# Patient Record
Sex: FEMALE | Race: WHITE | NOT HISPANIC OR LATINO | Employment: FULL TIME | URBAN - NONMETROPOLITAN AREA
[De-identification: names, ages, dates, MRNs, and addresses within clinical notes are randomized per-mention and may not be internally consistent; named-entity substitution may affect disease eponyms.]

---

## 2023-01-26 PROBLEM — L40.9 PSORIASIS: Status: ACTIVE | Noted: 2023-01-26

## 2023-01-26 PROBLEM — H05.401: Status: ACTIVE | Noted: 2023-01-26

## 2023-01-26 PROBLEM — J30.2 SEASONAL ALLERGIES: Status: ACTIVE | Noted: 2023-01-26

## 2023-01-26 PROBLEM — F32.A ANXIETY AND DEPRESSION: Status: ACTIVE | Noted: 2023-01-26

## 2023-01-26 PROBLEM — L84 CALLUS OF FOOT: Status: ACTIVE | Noted: 2023-01-26

## 2023-01-26 PROBLEM — M79.604 BILATERAL LEG AND FOOT PAIN: Status: ACTIVE | Noted: 2023-01-26

## 2023-01-26 PROBLEM — R23.2 HOT FLASHES: Status: ACTIVE | Noted: 2023-01-26

## 2023-01-26 PROBLEM — E78.5 DYSLIPIDEMIA: Status: ACTIVE | Noted: 2023-01-26

## 2023-01-26 PROBLEM — J45.909 ASTHMA (HHS-HCC): Status: ACTIVE | Noted: 2023-01-26

## 2023-01-26 PROBLEM — M79.605 BILATERAL LEG AND FOOT PAIN: Status: ACTIVE | Noted: 2023-01-26

## 2023-01-26 PROBLEM — M77.8 ELBOW TENDINITIS: Status: ACTIVE | Noted: 2023-01-26

## 2023-01-26 PROBLEM — M79.672 BILATERAL LEG AND FOOT PAIN: Status: ACTIVE | Noted: 2023-01-26

## 2023-01-26 PROBLEM — K21.9 ACID REFLUX: Status: ACTIVE | Noted: 2023-01-26

## 2023-01-26 PROBLEM — R73.9 HYPERGLYCEMIA: Status: ACTIVE | Noted: 2023-01-26

## 2023-01-26 PROBLEM — L90.0 LICHEN SCLEROSUS: Status: ACTIVE | Noted: 2023-01-26

## 2023-01-26 PROBLEM — D22.9 SKIN MOLE: Status: ACTIVE | Noted: 2023-01-26

## 2023-01-26 PROBLEM — F41.9 ANXIETY AND DEPRESSION: Status: ACTIVE | Noted: 2023-01-26

## 2023-01-26 PROBLEM — B07.0 PLANTAR WART, LEFT FOOT: Status: ACTIVE | Noted: 2023-01-26

## 2023-01-26 PROBLEM — M79.671 BILATERAL LEG AND FOOT PAIN: Status: ACTIVE | Noted: 2023-01-26

## 2023-01-26 RX ORDER — PAROXETINE 10 MG/1
1 TABLET, FILM COATED ORAL DAILY
COMMUNITY
Start: 2022-09-08 | End: 2023-03-09 | Stop reason: ALTCHOICE

## 2023-01-26 RX ORDER — PRAVASTATIN SODIUM 10 MG/1
1 TABLET ORAL NIGHTLY
COMMUNITY
Start: 2020-09-15 | End: 2023-12-04

## 2023-01-26 RX ORDER — OMEPRAZOLE 20 MG/1
1 CAPSULE, DELAYED RELEASE ORAL DAILY
COMMUNITY
Start: 2014-08-26 | End: 2023-06-29

## 2023-01-26 RX ORDER — ALBUTEROL SULFATE 90 UG/1
1-2 AEROSOL, METERED RESPIRATORY (INHALATION) AS NEEDED
COMMUNITY
End: 2023-03-09 | Stop reason: SDUPTHER

## 2023-01-26 RX ORDER — CLOTRIMAZOLE AND BETAMETHASONE DIPROPIONATE 10; .64 MG/G; MG/G
1 CREAM TOPICAL DAILY PRN
COMMUNITY
Start: 2019-10-17

## 2023-01-26 RX ORDER — LORATADINE 10 MG/1
1 TABLET ORAL DAILY PRN
COMMUNITY
Start: 2021-09-09

## 2023-03-09 ENCOUNTER — OFFICE VISIT (OUTPATIENT)
Dept: PRIMARY CARE | Facility: CLINIC | Age: 53
End: 2023-03-09
Payer: COMMERCIAL

## 2023-03-09 VITALS
HEART RATE: 71 BPM | DIASTOLIC BLOOD PRESSURE: 74 MMHG | BODY MASS INDEX: 27.79 KG/M2 | TEMPERATURE: 98.2 F | HEIGHT: 65 IN | SYSTOLIC BLOOD PRESSURE: 128 MMHG | OXYGEN SATURATION: 97 % | WEIGHT: 166.8 LBS

## 2023-03-09 DIAGNOSIS — J45.909 ASTHMA, UNSPECIFIED ASTHMA SEVERITY, UNSPECIFIED WHETHER COMPLICATED, UNSPECIFIED WHETHER PERSISTENT (HHS-HCC): ICD-10-CM

## 2023-03-09 DIAGNOSIS — H93.8X1 EAR CONGESTION, RIGHT: Primary | ICD-10-CM

## 2023-03-09 DIAGNOSIS — Z12.31 ENCOUNTER FOR SCREENING MAMMOGRAM FOR BREAST CANCER: ICD-10-CM

## 2023-03-09 PROBLEM — M77.8 ELBOW TENDINITIS: Status: RESOLVED | Noted: 2023-01-26 | Resolved: 2023-03-09

## 2023-03-09 PROBLEM — M79.672 BILATERAL LEG AND FOOT PAIN: Status: RESOLVED | Noted: 2023-01-26 | Resolved: 2023-03-09

## 2023-03-09 PROBLEM — M79.604 BILATERAL LEG AND FOOT PAIN: Status: RESOLVED | Noted: 2023-01-26 | Resolved: 2023-03-09

## 2023-03-09 PROBLEM — M79.671 BILATERAL LEG AND FOOT PAIN: Status: RESOLVED | Noted: 2023-01-26 | Resolved: 2023-03-09

## 2023-03-09 PROBLEM — D22.9 SKIN MOLE: Status: RESOLVED | Noted: 2023-01-26 | Resolved: 2023-03-09

## 2023-03-09 PROBLEM — B07.0 PLANTAR WART, LEFT FOOT: Status: RESOLVED | Noted: 2023-01-26 | Resolved: 2023-03-09

## 2023-03-09 PROBLEM — L84 CALLUS OF FOOT: Status: RESOLVED | Noted: 2023-01-26 | Resolved: 2023-03-09

## 2023-03-09 PROBLEM — R73.9 HYPERGLYCEMIA: Status: RESOLVED | Noted: 2023-01-26 | Resolved: 2023-03-09

## 2023-03-09 PROBLEM — M79.605 BILATERAL LEG AND FOOT PAIN: Status: RESOLVED | Noted: 2023-01-26 | Resolved: 2023-03-09

## 2023-03-09 PROCEDURE — 1036F TOBACCO NON-USER: CPT | Performed by: FAMILY MEDICINE

## 2023-03-09 PROCEDURE — 3008F BODY MASS INDEX DOCD: CPT | Performed by: FAMILY MEDICINE

## 2023-03-09 PROCEDURE — 99213 OFFICE O/P EST LOW 20 MIN: CPT | Performed by: FAMILY MEDICINE

## 2023-03-09 RX ORDER — ALBUTEROL SULFATE 90 UG/1
1-2 AEROSOL, METERED RESPIRATORY (INHALATION) AS NEEDED
Qty: 18 G | Refills: 3 | Status: SHIPPED | OUTPATIENT
Start: 2023-03-09

## 2023-03-09 ASSESSMENT — PATIENT HEALTH QUESTIONNAIRE - PHQ9
1. LITTLE INTEREST OR PLEASURE IN DOING THINGS: NOT AT ALL
2. FEELING DOWN, DEPRESSED OR HOPELESS: NOT AT ALL
SUM OF ALL RESPONSES TO PHQ9 QUESTIONS 1 AND 2: 0

## 2023-03-09 NOTE — PROGRESS NOTES
"Subjective   Patient ID: Vonda Callahan is a 52 y.o. female who presents for Ear (Feels like there is water in right ear and can't get it out-no pain).    HPI  Here for follow up, has been having ear congestion x few weeks periodically, does have nasal congestion, using loratadine. No fever, no cough.   Asthma under control, needing refill of her albuterol.   Has gained weight, restarted her exercise regimen and watching her diet    Never started the paxil , read on the side effects and didn't start.   Anxiety is manageable    Review of Systems    General: no fever  Eyes: no blurry vision  ENT: see HPI  Resp: no cough, sob or wheezing  Cardio: no chest pain, no palpitations  Abd: no nausea/vomiting  : no dysuria, no increased urinary frequency      /74   Pulse 71   Temp 36.8 °C (98.2 °F)   Ht 1.651 m (5' 5\")   Wt 75.7 kg (166 lb 12.8 oz)   SpO2 97%   BMI 27.76 kg/m²     Objective   Physical Exam  Gen: NAD, alert  Head: normocephalic/atraumatic  Eyes: conjunctivae normal  Ears: canals clear bilaterally, mild fluid behind ear  Nose: Deferred due to covid precautions, wearing mask  Oropharynx: Deferred due to covid precautions, wearing mask  Resp: Clear to auscultation  CVS: Regular rate and rhythm  Abdomen: soft, NT, ND  Ext: no edema, NT of lower extremities  Neuro: gait normal     Assessment/Plan   Problem List Items Addressed This Visit       Asthma    Relevant Medications    albuterol 90 mcg/actuation inhaler     Other Visit Diagnoses       Ear congestion, right    -  Primary  Continue loratadine    Encounter for screening mammogram for breast cancer        Relevant Orders    BI mammo bilateral screening tomosynthesis    BMI 27.0-27.9,adult                   "

## 2023-05-31 ENCOUNTER — OFFICE VISIT (OUTPATIENT)
Dept: PRIMARY CARE | Facility: CLINIC | Age: 53
End: 2023-05-31
Payer: COMMERCIAL

## 2023-05-31 VITALS
OXYGEN SATURATION: 98 % | WEIGHT: 158.6 LBS | BODY MASS INDEX: 26.42 KG/M2 | TEMPERATURE: 98.6 F | HEART RATE: 100 BPM | HEIGHT: 65 IN | DIASTOLIC BLOOD PRESSURE: 70 MMHG | SYSTOLIC BLOOD PRESSURE: 110 MMHG

## 2023-05-31 DIAGNOSIS — N39.0 URINARY TRACT INFECTION WITHOUT HEMATURIA, SITE UNSPECIFIED: ICD-10-CM

## 2023-05-31 DIAGNOSIS — N30.01 ACUTE CYSTITIS WITH HEMATURIA: Primary | ICD-10-CM

## 2023-05-31 DIAGNOSIS — R31.9 HEMATURIA, UNSPECIFIED TYPE: ICD-10-CM

## 2023-05-31 LAB
POC APPEARANCE, URINE: CLEAR
POC BILIRUBIN, URINE: NEGATIVE
POC BLOOD, URINE: ABNORMAL
POC COLOR, URINE: ABNORMAL
POC GLUCOSE, URINE: NEGATIVE MG/DL
POC KETONES, URINE: ABNORMAL MG/DL
POC LEUKOCYTES, URINE: ABNORMAL
POC NITRITE,URINE: NEGATIVE
POC PH, URINE: 6 PH
POC PROTEIN, URINE: ABNORMAL MG/DL
POC SPECIFIC GRAVITY, URINE: 1.02
POC UROBILINOGEN, URINE: 0.2 EU/DL

## 2023-05-31 PROCEDURE — 99213 OFFICE O/P EST LOW 20 MIN: CPT | Performed by: PHYSICIAN ASSISTANT

## 2023-05-31 PROCEDURE — 81003 URINALYSIS AUTO W/O SCOPE: CPT | Performed by: PHYSICIAN ASSISTANT

## 2023-05-31 PROCEDURE — 87086 URINE CULTURE/COLONY COUNT: CPT

## 2023-05-31 PROCEDURE — 1036F TOBACCO NON-USER: CPT | Performed by: PHYSICIAN ASSISTANT

## 2023-05-31 PROCEDURE — 87077 CULTURE AEROBIC IDENTIFY: CPT

## 2023-05-31 PROCEDURE — 87186 SC STD MICRODIL/AGAR DIL: CPT

## 2023-05-31 PROCEDURE — 3008F BODY MASS INDEX DOCD: CPT | Performed by: PHYSICIAN ASSISTANT

## 2023-05-31 RX ORDER — PHENAZOPYRIDINE HYDROCHLORIDE 200 MG/1
200 TABLET, FILM COATED ORAL
Qty: 6 TABLET | Refills: 0 | Status: SHIPPED | OUTPATIENT
Start: 2023-05-31 | End: 2023-06-02

## 2023-05-31 RX ORDER — NITROFURANTOIN 25; 75 MG/1; MG/1
100 CAPSULE ORAL 2 TIMES DAILY
Qty: 14 CAPSULE | Refills: 0 | Status: SHIPPED | OUTPATIENT
Start: 2023-05-31 | End: 2023-06-07

## 2023-05-31 ASSESSMENT — PATIENT HEALTH QUESTIONNAIRE - PHQ9
SUM OF ALL RESPONSES TO PHQ9 QUESTIONS 1 AND 2: 0
1. LITTLE INTEREST OR PLEASURE IN DOING THINGS: NOT AT ALL
2. FEELING DOWN, DEPRESSED OR HOPELESS: NOT AT ALL

## 2023-05-31 NOTE — PROGRESS NOTES
Subjective   Patient ID: Vonda Callahan is a 52 y.o. female who presents for UTI (Yesterday, drank the usual water that she drinks but only a little would come out. Burning, urgency, freq. ).    HPI   Vonda Callahan is a 52 y.o. year old female patient with presenting to clinic with concern for   Chief Complaint   Patient presents with    UTI     Yesterday, drank the usual water that she drinks but only a little would come out. Burning, urgency, freq.        Pt reports 1 day hx urinary burning, frequency and urge. Concern for UTI Denies back pain, fever or chills.    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    Fracture of one rib, left side, initial encounter for closed fracture 08/09/2019    Closed fracture of one rib of left side, initial encounter    Fracture of orbital floor, right side, initial encounter for closed fracture (CMS/[facility]) 03/17/2020    Fracture of right orbital floor    Fracture of orbital floor, unspecified side, initial encounter for closed fracture (CMS/[facility]) 04/24/2015    Fracture of orbital floor    History of falling 04/24/2015    History of fall      Past Surgical History:   Procedure Laterality Date    HAND SURGERY  04/24/2015    Hand Surgery                                                                                                                                                            Family History   Problem Relation Name Age of Onset    Lung cancer Other Grandmother     Uterine cancer Other Grandmother     Uterine cancer Other Grandfather     Aneurysm Other Grandfather     Uterine cancer Other Aunt     Esophageal cancer Other Uncle     Stomach cancer  "Other Uncle     Colon cancer Other Family History     Ovarian cancer Other Family History       Social History     Tobacco Use    Smoking status: Never    Smokeless tobacco: Never   Vaping Use    Vaping status: Not on file   Substance Use Topics    Alcohol use: Yes     Comment: socaily        Current Outpatient Medications:     albuterol 90 mcg/actuation inhaler, Inhale 1-2 puffs if needed (Inhale 1-2 puffs every 4-6 hours as needed)., Disp: 18 g, Rfl: 3    clotrimazole-betamethasone (Lotrisone) cream, Apply 1 Application topically once daily as needed (Apply and rub in a thin film to affected areas once a daily as needed for rash)., Disp: , Rfl:     loratadine (Claritin) 10 mg tablet, Take 1 tablet (10 mg) by mouth once daily as needed (As needed for congestion)., Disp: , Rfl:     omeprazole (PriLOSEC) 20 mg DR capsule, Take 1 capsule (20 mg) by mouth once daily., Disp: , Rfl:     pravastatin (Pravachol) 10 mg tablet, Take 1 tablet (10 mg) by mouth once daily at bedtime., Disp: , Rfl:     nitrofurantoin, macrocrystal-monohydrate, (Macrobid) 100 mg capsule, Take 1 capsule (100 mg) by mouth 2 times a day for 7 days., Disp: 14 capsule, Rfl: 0    phenazopyridine (Pyridium) 200 mg tablet, Take 1 tablet (200 mg) by mouth 3 times a day with meals for 2 days., Disp: 6 tablet, Rfl: 0        Review of Systems  Review of Systems:   Constitutional: Denies fever  HEENT: Denies ST, earache  CVS: Denies Chest pain  Pulmonary: Denies wheezing, SOB  GI: Denies N/V  : Denies dysuria  Musculoskeletal:  Denies myalgia  Neuro: Denies focal weakness or numbness.  Skin: Denies Rashes.  *Review of Systems is negative unless otherwise mentioned in HPI or ROS above.      Objective   /70   Pulse 100   Temp 37 °C (98.6 °F)   Ht 1.651 m (5' 5\")   Wt 71.9 kg (158 lb 9.6 oz)   SpO2 98%   BMI 26.39 kg/m²     Physical Exam  Physical exam:  /70   Pulse 100   Temp 37 °C (98.6 °F)   Ht 1.651 m (5' 5\")   Wt 71.9 kg (158 lb 9.6 " oz)   SpO2 98%   BMI 26.39 kg/m²  reviewed   Body mass index is 26.39 kg/m².     Constitutional: NAD.  Resting comfortably.  Head: Atraumatic, normocephalic.  EENT: deferred d/t masking  Cardiac: Regular rate & rhythm.   Pulmonary: Lungs clear bilat  GI: Soft, Nontender, nondistended.   Musculoskeletal: No peripheral edema.   Skin: No evidence of trauma. No rashes  Psych: Intact judgement and insight.    Assessment/Plan   Problem List Items Addressed This Visit    None  Visit Diagnoses       Acute cystitis with hematuria    -  Primary    Relevant Medications    nitrofurantoin, macrocrystal-monohydrate, (Macrobid) 100 mg capsule    phenazopyridine (Pyridium) 200 mg tablet    Urinary tract infection without hematuria, site unspecified        Relevant Orders    POCT UA Automated manually resulted (Completed)    Hematuria, unspecified type        Relevant Orders    Urine Culture

## 2023-06-03 LAB — URINE CULTURE: ABNORMAL

## 2023-06-29 DIAGNOSIS — K21.9 GASTROESOPHAGEAL REFLUX DISEASE WITHOUT ESOPHAGITIS: Primary | ICD-10-CM

## 2023-06-29 RX ORDER — OMEPRAZOLE 20 MG/1
CAPSULE, DELAYED RELEASE ORAL
Qty: 90 CAPSULE | Refills: 3 | Status: SHIPPED | OUTPATIENT
Start: 2023-06-29

## 2023-09-12 ENCOUNTER — APPOINTMENT (OUTPATIENT)
Dept: PRIMARY CARE | Facility: CLINIC | Age: 53
End: 2023-09-12
Payer: COMMERCIAL

## 2023-11-07 ENCOUNTER — APPOINTMENT (OUTPATIENT)
Dept: PRIMARY CARE | Facility: CLINIC | Age: 53
End: 2023-11-07
Payer: COMMERCIAL

## 2023-11-09 ENCOUNTER — OFFICE VISIT (OUTPATIENT)
Dept: PRIMARY CARE | Facility: CLINIC | Age: 53
End: 2023-11-09
Payer: COMMERCIAL

## 2023-11-09 VITALS
OXYGEN SATURATION: 98 % | BODY MASS INDEX: 26.56 KG/M2 | DIASTOLIC BLOOD PRESSURE: 84 MMHG | HEIGHT: 65 IN | HEART RATE: 71 BPM | TEMPERATURE: 97 F | WEIGHT: 159.4 LBS | SYSTOLIC BLOOD PRESSURE: 126 MMHG

## 2023-11-09 DIAGNOSIS — Z00.00 ANNUAL PHYSICAL EXAM: Primary | ICD-10-CM

## 2023-11-09 DIAGNOSIS — E78.5 HYPERLIPIDEMIA, UNSPECIFIED HYPERLIPIDEMIA TYPE: ICD-10-CM

## 2023-11-09 PROCEDURE — 1036F TOBACCO NON-USER: CPT | Performed by: FAMILY MEDICINE

## 2023-11-09 PROCEDURE — 99396 PREV VISIT EST AGE 40-64: CPT | Performed by: FAMILY MEDICINE

## 2023-11-09 PROCEDURE — 3008F BODY MASS INDEX DOCD: CPT | Performed by: FAMILY MEDICINE

## 2023-11-09 ASSESSMENT — ENCOUNTER SYMPTOMS
DEPRESSION: 0
LOSS OF SENSATION IN FEET: 0
OCCASIONAL FEELINGS OF UNSTEADINESS: 0

## 2023-11-09 NOTE — PROGRESS NOTES
"Subjective   Patient ID: Vonda Callahan is a 53 y.o. female who presents for Annual Exam.  HPI  Here for annual physical  Still has not gotten her mammogram done  Has hyperlipidemia, compliant with pravastatin  GERD under control with omeprazole    Review of Systems  General: no fever  Eyes: no blurry vision  ENT: no sore throat, no ear pain  Resp: no cough, sob or wheezing  Cardio: no chest pain, no palpitations  Abd: no nausea/vomiting  : no dysuria, no increased urinary frequency      /84   Pulse 71   Temp 36.1 °C (97 °F)   Ht 1.651 m (5' 5\")   Wt 72.3 kg (159 lb 6.4 oz)   SpO2 98%   BMI 26.53 kg/m²       Objective   Physical Exam  Gen: NAD, alert  Head: normocephalic/atraumatic  Eyes: conjunctivae normal  Ears: canals clear bilaterally, TM normal   Nose: external nose normal   Oropharynx: clear   Resp: Clear to auscultation  CVS: Regular rate and rhythm  Abdomen: soft, NT, ND  Ext: no edema, NT of lower extremities  Neuro: gait normal     Assessment/Plan   Problem List Items Addressed This Visit    None  Visit Diagnoses       Annual physical exam    -  Primary    Relevant Orders    Lipid Panel    CBC    Comprehensive Metabolic Panel    Hyperlipidemia, unspecified hyperlipidemia type        Relevant Orders    Lipid Panel    TSH with reflex to Free T4 if abnormal    CBC    Comprehensive Metabolic Panel    CT cardiac scoring wo IV contrast    BMI 26.0-26.9,adult                   "

## 2023-11-09 NOTE — PATIENT INSTRUCTIONS
Please follow up in 1 year for your annual physical  Please take your medications as prescribed  Please get your blood work, mammogram and CT scan done

## 2023-12-04 DIAGNOSIS — K21.9 GASTROESOPHAGEAL REFLUX DISEASE, UNSPECIFIED WHETHER ESOPHAGITIS PRESENT: ICD-10-CM

## 2023-12-04 RX ORDER — PRAVASTATIN SODIUM 10 MG/1
10 TABLET ORAL NIGHTLY
Qty: 90 TABLET | Refills: 3 | Status: SHIPPED | OUTPATIENT
Start: 2023-12-04

## 2024-02-12 ENCOUNTER — OFFICE VISIT (OUTPATIENT)
Dept: DERMATOLOGY | Facility: CLINIC | Age: 54
End: 2024-02-12
Payer: COMMERCIAL

## 2024-02-12 DIAGNOSIS — L81.4 LENTIGO: ICD-10-CM

## 2024-02-12 DIAGNOSIS — D22.9 BENIGN NEVUS: ICD-10-CM

## 2024-02-12 DIAGNOSIS — L40.9 PSORIASIS: ICD-10-CM

## 2024-02-12 DIAGNOSIS — L21.9 SEBORRHEIC DERMATITIS: ICD-10-CM

## 2024-02-12 DIAGNOSIS — L82.1 SEBORRHEIC KERATOSIS: ICD-10-CM

## 2024-02-12 DIAGNOSIS — D18.01 ANGIOMA OF SKIN: Primary | ICD-10-CM

## 2024-02-12 DIAGNOSIS — L57.9 SKIN CHANGES DUE TO CHRONIC EXPOSURE TO NONIONIZING RADIATION: ICD-10-CM

## 2024-02-12 PROCEDURE — 3008F BODY MASS INDEX DOCD: CPT | Performed by: NURSE PRACTITIONER

## 2024-02-12 PROCEDURE — 99214 OFFICE O/P EST MOD 30 MIN: CPT | Performed by: NURSE PRACTITIONER

## 2024-02-12 PROCEDURE — 1036F TOBACCO NON-USER: CPT | Performed by: NURSE PRACTITIONER

## 2024-02-12 NOTE — PATIENT INSTRUCTIONS

## 2024-02-12 NOTE — PROGRESS NOTES
Subjective     Vonda Callahan is a 53 y.o. female who presents for the following: Skin Check.     Review of Systems:  No other skin or systemic complaints other than what is documented elsewhere in the note.    The following portions of the chart were reviewed this encounter and updated as appropriate:   Tobacco  Allergies  Meds  Problems  Med Hx  Surg Hx         Skin Cancer History  No skin cancer on file.      Specialty Problems          Dermatology Problems    Lichen sclerosus    Psoriasis        Objective   Well appearing patient in no apparent distress; mood and affect are within normal limits.    A full examination was performed including scalp, head, eyes, ears, nose, lips, neck, chest, axillae, abdomen, back, buttocks, bilateral upper extremities, bilateral lower extremities, hands, feet, fingers, toes, fingernails, and toenails. All findings within normal limits unless otherwise noted below.      Assessment/Plan   1. Angioma of skin  Scattered cherry-red papule(s).    A cherry hemangioma is a small macule (small, flat, smooth area) or papule (small, solid bump) formed from an overgrowth of tiny blood vessels in the skin. Cherry hemangiomas are characteristically red or purplish in color. They often first appear in middle adulthood and usually increase in number with age. Cherry hemangiomas are noncancerous (benign) and are common in adults.    The present appearance of the lesion is not worrisome but it should continue to be observed and testing/treatment may be warranted if change occurs.    2. Benign nevus  Scattered, uniform and benign-appearing, regular brown melanocytic papules and macules.    1. Left lower back has a slightly asymmetrical round 3 x 4.5 dark brown to tan brown maculewith central slight hypopigmented tan discoloration.  2. Left ankle has a 7.5 x 5 mm dark brown irregular shape macule.  3. The right lower abdomen has a 0.7 cm x 2cm dark brown tan patch with central light tan color      The present appearance of the lesions are not worrisome but it should continue to be observed and testing/treatment may be warranted if change occurs.    Lesions being monitored are stable. Compared to baseline photos, no new or changing lesions noted.     3. Seborrheic keratosis  Stuck on verrucous, tan-brown papules and plaques.      Seborrheic keratoses are common noncancerous (benign) growths of unknown cause seen in adults due to a thickening of an area of the top skin layer. Seborrheic keratoses may appear as if they are stuck on to the skin. They have distinct borders, and they may appear as papules (small, solid bumps) or plaques (solid, raised patches that are bigger than a thumbnail). They may be the same color as your skin, or they may be pink, light brown, darker brown, or very dark brown, or sometimes may appear black.    There is no way to prevent new seborrheic keratoses from forming. Seborrheic keratoses can be removed, but removal is considered a cosmetic issue and is usually not covered by insurance.    PLAN  No treatment is needed unless there is irritation from clothing, such as itching or bleeding.  2.   Some lotions containing alpha hydroxy acids, salicylic acid, or urea may make the areas feel smoother with regular use but will not eliminate them.    4. Lentigo  Scattered tan macules in sun-exposed areas.    A solar lentigo (plural, solar lentigines), also known as a sun-induced freckle or senile lentigo, is a dark (hyperpigmented) lesion caused by natural or artificial ultraviolet (UV) light. Solar lentigines may be single or multiple. This type of lentigo is different from a simple lentigo (lentigo simplex) because it is caused by exposure to UV light. Solar lentigines are benign, but they do indicate excessive sun exposure, a risk factor for the development of skin cancer.    To prevent solar lentigines, avoid exposure to sunlight in midday (10 AM to 3 PM), wear sun-protective clothing  (tightly woven clothes and hats), and apply sunscreen (SPF 30 UVA and UVB block).    The present appearance of the lesion is not worrisome but it should continue to be observed and testing/treatment may be warranted if change occurs.    5. Skin changes due to chronic exposure to nonionizing radiation  Actinic changes in the form of freckles, lentigines and hyper/hypopigmentation     ABCDEs of melanoma and atypical moles were discussed with the patient.    Patient was instructed to perform monthly self skin examination.  We recommended that the patient have regular full skin exams given an increased risk of subsequent skin cancers.    The patient was instructed to use sun protective behaviors including use of broad spectrum sunscreens and sun protective clothing to reduce risk of skin cancers.    Warning signs of non-melanoma skin cancer discussed.    6. Seborrheic dermatitis  Head - Anterior (Face)  No erythematous papules or plaques with greasy scale to affected areas    Scalp, T zone and post auricular area is clear. Uses ketoconazole cream twice daily as needed.     7. Psoriasis  A few pink scaly papules. <1%BSA    Rarely using HTC cream. Continue with using to affected areas twice daily as needed. No RF needed.         Return to clinic in 1 year for skin check/follow up or sooner if needed

## 2024-02-16 ENCOUNTER — HOSPITAL ENCOUNTER (OUTPATIENT)
Dept: RADIOLOGY | Facility: HOSPITAL | Age: 54
Discharge: HOME | End: 2024-02-16
Payer: COMMERCIAL

## 2024-02-16 ENCOUNTER — LAB (OUTPATIENT)
Dept: LAB | Facility: LAB | Age: 54
End: 2024-02-16
Payer: COMMERCIAL

## 2024-02-16 DIAGNOSIS — Z00.00 ANNUAL PHYSICAL EXAM: ICD-10-CM

## 2024-02-16 DIAGNOSIS — E78.5 HYPERLIPIDEMIA, UNSPECIFIED HYPERLIPIDEMIA TYPE: ICD-10-CM

## 2024-02-16 DIAGNOSIS — R73.9 HYPERGLYCEMIA: Primary | ICD-10-CM

## 2024-02-16 DIAGNOSIS — Z12.31 ENCOUNTER FOR SCREENING MAMMOGRAM FOR BREAST CANCER: ICD-10-CM

## 2024-02-16 DIAGNOSIS — R73.9 HYPERGLYCEMIA: ICD-10-CM

## 2024-02-16 LAB
ALBUMIN SERPL BCP-MCNC: 4.5 G/DL (ref 3.4–5)
ALP SERPL-CCNC: 80 U/L (ref 33–110)
ALT SERPL W P-5'-P-CCNC: 29 U/L (ref 7–45)
ANION GAP SERPL CALC-SCNC: 13 MMOL/L (ref 10–20)
AST SERPL W P-5'-P-CCNC: 17 U/L (ref 9–39)
BILIRUB SERPL-MCNC: 0.6 MG/DL (ref 0–1.2)
BUN SERPL-MCNC: 12 MG/DL (ref 6–23)
CALCIUM SERPL-MCNC: 9.5 MG/DL (ref 8.6–10.3)
CHLORIDE SERPL-SCNC: 103 MMOL/L (ref 98–107)
CHOLEST SERPL-MCNC: 201 MG/DL (ref 0–199)
CHOLESTEROL/HDL RATIO: 3.4
CO2 SERPL-SCNC: 28 MMOL/L (ref 21–32)
CREAT SERPL-MCNC: 0.79 MG/DL (ref 0.5–1.05)
EGFRCR SERPLBLD CKD-EPI 2021: 88 ML/MIN/1.73M*2
ERYTHROCYTE [DISTWIDTH] IN BLOOD BY AUTOMATED COUNT: 12.8 % (ref 11.5–14.5)
GLUCOSE SERPL-MCNC: 130 MG/DL (ref 74–99)
HCT VFR BLD AUTO: 39.4 % (ref 36–46)
HDLC SERPL-MCNC: 59.8 MG/DL
HGB BLD-MCNC: 13 G/DL (ref 12–16)
LDLC SERPL CALC-MCNC: 120 MG/DL
MCH RBC QN AUTO: 30.2 PG (ref 26–34)
MCHC RBC AUTO-ENTMCNC: 33 G/DL (ref 32–36)
MCV RBC AUTO: 92 FL (ref 80–100)
NON HDL CHOLESTEROL: 141 MG/DL (ref 0–149)
NRBC BLD-RTO: 0 /100 WBCS (ref 0–0)
PLATELET # BLD AUTO: 204 X10*3/UL (ref 150–450)
POTASSIUM SERPL-SCNC: 4.2 MMOL/L (ref 3.5–5.3)
PROT SERPL-MCNC: 6.8 G/DL (ref 6.4–8.2)
RBC # BLD AUTO: 4.3 X10*6/UL (ref 4–5.2)
SODIUM SERPL-SCNC: 140 MMOL/L (ref 136–145)
TRIGL SERPL-MCNC: 107 MG/DL (ref 0–149)
TSH SERPL-ACNC: 1.27 MIU/L (ref 0.44–3.98)
VLDL: 21 MG/DL (ref 0–40)
WBC # BLD AUTO: 5 X10*3/UL (ref 4.4–11.3)

## 2024-02-16 PROCEDURE — 36415 COLL VENOUS BLD VENIPUNCTURE: CPT

## 2024-02-16 PROCEDURE — 77067 SCR MAMMO BI INCL CAD: CPT | Mod: BILATERAL PROCEDURE | Performed by: STUDENT IN AN ORGANIZED HEALTH CARE EDUCATION/TRAINING PROGRAM

## 2024-02-16 PROCEDURE — 84443 ASSAY THYROID STIM HORMONE: CPT

## 2024-02-16 PROCEDURE — 77067 SCR MAMMO BI INCL CAD: CPT

## 2024-02-16 PROCEDURE — 80053 COMPREHEN METABOLIC PANEL: CPT

## 2024-02-16 PROCEDURE — 75571 CT HRT W/O DYE W/CA TEST: CPT

## 2024-02-16 PROCEDURE — 85027 COMPLETE CBC AUTOMATED: CPT

## 2024-02-16 PROCEDURE — 77063 BREAST TOMOSYNTHESIS BI: CPT | Mod: BILATERAL PROCEDURE | Performed by: STUDENT IN AN ORGANIZED HEALTH CARE EDUCATION/TRAINING PROGRAM

## 2024-02-16 PROCEDURE — 80061 LIPID PANEL: CPT

## 2024-02-16 PROCEDURE — 83036 HEMOGLOBIN GLYCOSYLATED A1C: CPT

## 2024-02-17 LAB
EST. AVERAGE GLUCOSE BLD GHB EST-MCNC: 123 MG/DL
HBA1C MFR BLD: 5.9 %

## 2024-02-20 DIAGNOSIS — R93.1 ABNORMAL HEART SCORE CT: Primary | ICD-10-CM

## 2024-04-03 DIAGNOSIS — L21.9 SEBORRHEIC DERMATITIS: Primary | ICD-10-CM

## 2024-04-04 RX ORDER — KETOCONAZOLE 20 MG/G
CREAM TOPICAL
Qty: 60 G | Refills: 0 | Status: SHIPPED | OUTPATIENT
Start: 2024-04-04

## 2024-06-26 DIAGNOSIS — K21.9 GASTROESOPHAGEAL REFLUX DISEASE WITHOUT ESOPHAGITIS: ICD-10-CM

## 2024-06-26 RX ORDER — OMEPRAZOLE 20 MG/1
CAPSULE, DELAYED RELEASE ORAL
Qty: 90 CAPSULE | Refills: 0 | Status: SHIPPED | OUTPATIENT
Start: 2024-06-26

## 2024-06-29 ENCOUNTER — LAB (OUTPATIENT)
Dept: LAB | Facility: LAB | Age: 54
End: 2024-06-29
Payer: COMMERCIAL

## 2024-07-02 ENCOUNTER — LAB (OUTPATIENT)
Dept: LAB | Facility: LAB | Age: 54
End: 2024-07-02
Payer: COMMERCIAL

## 2024-07-02 DIAGNOSIS — E78.49 OTHER HYPERLIPIDEMIA: Primary | ICD-10-CM

## 2024-07-02 LAB
CHOLEST SERPL-MCNC: 182 MG/DL (ref 0–199)
CHOLESTEROL/HDL RATIO: 2.9
HDLC SERPL-MCNC: 63.6 MG/DL
LDLC SERPL CALC-MCNC: 97 MG/DL
NON HDL CHOLESTEROL: 118 MG/DL (ref 0–149)
TRIGL SERPL-MCNC: 106 MG/DL (ref 0–149)
VLDL: 21 MG/DL (ref 0–40)

## 2024-07-02 PROCEDURE — 36415 COLL VENOUS BLD VENIPUNCTURE: CPT

## 2024-07-31 DIAGNOSIS — K21.9 GASTROESOPHAGEAL REFLUX DISEASE, UNSPECIFIED WHETHER ESOPHAGITIS PRESENT: ICD-10-CM

## 2024-07-31 RX ORDER — PRAVASTATIN SODIUM 10 MG/1
10 TABLET ORAL NIGHTLY
Qty: 90 TABLET | Refills: 3 | Status: SHIPPED | OUTPATIENT
Start: 2024-07-31

## 2024-09-30 DIAGNOSIS — K21.9 GASTROESOPHAGEAL REFLUX DISEASE WITHOUT ESOPHAGITIS: ICD-10-CM

## 2024-10-01 ENCOUNTER — TELEPHONE (OUTPATIENT)
Dept: PRIMARY CARE | Facility: CLINIC | Age: 54
End: 2024-10-01
Payer: COMMERCIAL

## 2024-10-01 RX ORDER — OMEPRAZOLE 20 MG/1
20 CAPSULE, DELAYED RELEASE ORAL DAILY
Qty: 90 CAPSULE | Refills: 0 | Status: SHIPPED | OUTPATIENT
Start: 2024-10-01 | End: 2024-10-04 | Stop reason: SDUPTHER

## 2024-10-01 NOTE — TELEPHONE ENCOUNTER
Can you please schedule her for her annual physical in November, almost been a year since last seen  Thanks

## 2024-10-04 ENCOUNTER — OFFICE VISIT (OUTPATIENT)
Dept: PRIMARY CARE | Facility: CLINIC | Age: 54
End: 2024-10-04
Payer: COMMERCIAL

## 2024-10-04 VITALS
DIASTOLIC BLOOD PRESSURE: 80 MMHG | WEIGHT: 149.2 LBS | TEMPERATURE: 97.1 F | HEIGHT: 65 IN | BODY MASS INDEX: 24.86 KG/M2 | HEART RATE: 64 BPM | OXYGEN SATURATION: 97 % | SYSTOLIC BLOOD PRESSURE: 138 MMHG

## 2024-10-04 DIAGNOSIS — J45.909 ASTHMA, UNSPECIFIED ASTHMA SEVERITY, UNSPECIFIED WHETHER COMPLICATED, UNSPECIFIED WHETHER PERSISTENT (HHS-HCC): ICD-10-CM

## 2024-10-04 DIAGNOSIS — L84 CORN OF TOE: ICD-10-CM

## 2024-10-04 DIAGNOSIS — K21.9 GASTROESOPHAGEAL REFLUX DISEASE WITHOUT ESOPHAGITIS: ICD-10-CM

## 2024-10-04 DIAGNOSIS — E78.5 HYPERLIPIDEMIA, UNSPECIFIED HYPERLIPIDEMIA TYPE: Primary | ICD-10-CM

## 2024-10-04 DIAGNOSIS — M21.619 BUNION: ICD-10-CM

## 2024-10-04 DIAGNOSIS — B07.0 PLANTAR WART OF LEFT FOOT: ICD-10-CM

## 2024-10-04 PROCEDURE — 99213 OFFICE O/P EST LOW 20 MIN: CPT | Performed by: FAMILY MEDICINE

## 2024-10-04 PROCEDURE — 1036F TOBACCO NON-USER: CPT | Performed by: FAMILY MEDICINE

## 2024-10-04 PROCEDURE — 3008F BODY MASS INDEX DOCD: CPT | Performed by: FAMILY MEDICINE

## 2024-10-04 RX ORDER — PRAVASTATIN SODIUM 10 MG/1
10 TABLET ORAL NIGHTLY
Qty: 90 TABLET | Refills: 3 | Status: SHIPPED | OUTPATIENT
Start: 2024-10-04

## 2024-10-04 RX ORDER — ALBUTEROL SULFATE 90 UG/1
1-2 INHALANT RESPIRATORY (INHALATION) AS NEEDED
Qty: 18 G | Refills: 3 | Status: SHIPPED | OUTPATIENT
Start: 2024-10-04

## 2024-10-04 RX ORDER — OMEPRAZOLE 20 MG/1
20 CAPSULE, DELAYED RELEASE ORAL DAILY
Qty: 90 CAPSULE | Refills: 3 | Status: SHIPPED | OUTPATIENT
Start: 2024-10-04

## 2024-10-04 RX ORDER — PRAVASTATIN SODIUM 10 MG/1
10 TABLET ORAL NIGHTLY
Qty: 90 TABLET | Refills: 3 | Status: CANCELLED | OUTPATIENT
Start: 2024-10-04

## 2024-10-04 RX ORDER — OMEPRAZOLE 20 MG/1
20 CAPSULE, DELAYED RELEASE ORAL DAILY
Qty: 90 CAPSULE | Refills: 3 | Status: CANCELLED | OUTPATIENT
Start: 2024-10-04

## 2024-10-04 ASSESSMENT — PATIENT HEALTH QUESTIONNAIRE - PHQ9
2. FEELING DOWN, DEPRESSED OR HOPELESS: NOT AT ALL
SUM OF ALL RESPONSES TO PHQ9 QUESTIONS 1 AND 2: 0
1. LITTLE INTEREST OR PLEASURE IN DOING THINGS: NOT AT ALL

## 2024-10-04 NOTE — PROGRESS NOTES
"Subjective   Patient ID: Vonda Callahan is a 54 y.o. female who presents for Follow-up    HPI  Here for follow up  Needing refills of meds  Patient has wart on her left foot, and corn on her right foot that she wants removed.     Review of Systems  General: no fever  Eyes: no blurry vision  ENT: no sore throat, no ear pain  Resp: no cough, sob or wheezing  Cardio: no chest pain, no palpitations  Abd: no nausea/vomiting  : no dysuria, no increased urinary frequency      /80   Pulse 64   Temp 36.2 °C (97.1 °F)   Ht 1.651 m (5' 5\")   Wt 67.7 kg (149 lb 3.2 oz)   SpO2 97%   BMI 24.83 kg/m²       Objective   Physical Exam  Gen: NAD, alert  Head: normocephalic/atraumatic  Eyes: conjunctivae normal  Ears: canals clear bilaterally, TM normal   Nose: external nose normal   Oropharynx: clear   Resp: Clear to auscultation  CVS: Regular rate and rhythm  Abdomen: soft, NT, ND  Ext: no edema, NT of lower extremities, plantar wart on left foot, corn on big toe of right foot. Bunions present  Neuro: gait normal     Patient ID: Vonda Callahan is a 54 y.o. female.    Destruction of lesion    Date/Time: 10/4/2024 10:06 AM    Performed by: Keagan Youssef MD  Authorized by: Keagan Youssef MD    Number of Lesions: 1  Lesion 1:     Body area: lower extremity    Lower extremity location: L foot    Malignancy: benign lesion      Destruction method: cryotherapy      Comments:  Area was prepped with betadine, wart was cryofreezed, and shaved down with scalpel. Triple antibiotic was applied, and bandage was applied. Patient tolerated procedure without any complications    Betadine was applied to the corn on her right large toe, corn was shaved down with scalpel. Patient tolerated procedure without any complications    Assessment/Plan   Problem List Items Addressed This Visit       Acid reflux    Relevant Medications    omeprazole (PriLOSEC) 20 mg DR capsule    Asthma    Relevant Medications    albuterol 90 " mcg/actuation inhaler     Other Visit Diagnoses       Hyperlipidemia, unspecified hyperlipidemia type    -  Primary    Relevant Medications    pravastatin (Pravachol) 10 mg tablet    Plantar wart of left foot        Relevant Orders    Destruction of lesion    Bunion        Relevant Orders    Referral to Podiatry    Realitos of toe      Shaved down

## 2024-11-05 ENCOUNTER — APPOINTMENT (OUTPATIENT)
Dept: DERMATOLOGY | Facility: CLINIC | Age: 54
End: 2024-11-05
Payer: COMMERCIAL

## 2025-05-13 ENCOUNTER — OFFICE VISIT (OUTPATIENT)
Dept: PODIATRY | Facility: CLINIC | Age: 55
End: 2025-05-13
Payer: COMMERCIAL

## 2025-05-13 ENCOUNTER — APPOINTMENT (OUTPATIENT)
Dept: PODIATRY | Facility: CLINIC | Age: 55
End: 2025-05-13
Payer: COMMERCIAL

## 2025-05-13 DIAGNOSIS — B07.0 PLANTAR WART OF LEFT FOOT: ICD-10-CM

## 2025-05-13 DIAGNOSIS — M79.672 PAIN IN BOTH FEET: Primary | ICD-10-CM

## 2025-05-13 DIAGNOSIS — M79.671 PAIN IN BOTH FEET: Primary | ICD-10-CM

## 2025-05-13 DIAGNOSIS — R26.2 DIFFICULTY WALKING: ICD-10-CM

## 2025-05-13 PROCEDURE — 1036F TOBACCO NON-USER: CPT | Performed by: PODIATRIST

## 2025-05-13 PROCEDURE — 99203 OFFICE O/P NEW LOW 30 MIN: CPT | Performed by: PODIATRIST

## 2025-05-13 PROCEDURE — 17110 DESTRUCTION B9 LES UP TO 14: CPT | Performed by: PODIATRIST

## 2025-05-20 ENCOUNTER — APPOINTMENT (OUTPATIENT)
Dept: PRIMARY CARE | Facility: CLINIC | Age: 55
End: 2025-05-20
Payer: COMMERCIAL

## 2025-05-20 VITALS
TEMPERATURE: 97.6 F | OXYGEN SATURATION: 99 % | SYSTOLIC BLOOD PRESSURE: 112 MMHG | HEART RATE: 64 BPM | BODY MASS INDEX: 24.53 KG/M2 | WEIGHT: 147.2 LBS | DIASTOLIC BLOOD PRESSURE: 62 MMHG | HEIGHT: 65 IN

## 2025-05-20 DIAGNOSIS — Z00.00 ROUTINE HEALTH MAINTENANCE: ICD-10-CM

## 2025-05-20 DIAGNOSIS — B07.0 PLANTAR WART OF RIGHT FOOT: ICD-10-CM

## 2025-05-20 DIAGNOSIS — Z12.4 PAP SMEAR FOR CERVICAL CANCER SCREENING: Primary | ICD-10-CM

## 2025-05-20 DIAGNOSIS — Z12.31 ENCOUNTER FOR SCREENING MAMMOGRAM FOR MALIGNANT NEOPLASM OF BREAST: ICD-10-CM

## 2025-05-20 PROCEDURE — 99396 PREV VISIT EST AGE 40-64: CPT | Performed by: FAMILY MEDICINE

## 2025-05-20 ASSESSMENT — PATIENT HEALTH QUESTIONNAIRE - PHQ9
SUM OF ALL RESPONSES TO PHQ9 QUESTIONS 1 AND 2: 2
1. LITTLE INTEREST OR PLEASURE IN DOING THINGS: SEVERAL DAYS
10. IF YOU CHECKED OFF ANY PROBLEMS, HOW DIFFICULT HAVE THESE PROBLEMS MADE IT FOR YOU TO DO YOUR WORK, TAKE CARE OF THINGS AT HOME, OR GET ALONG WITH OTHER PEOPLE: SOMEWHAT DIFFICULT
2. FEELING DOWN, DEPRESSED OR HOPELESS: SEVERAL DAYS

## 2025-05-20 NOTE — PROGRESS NOTES
"Subjective   Patient ID: Vonda Callahan is a 54 y.o. female who presents for Gynecologic Exam.  HPI  Here for her pap smear  Last pap smear done 2021 . Denies any abnormal pap smear. No vaginal discharge, itching, dysuria. Menopausal. Does not want any STD check at this time  Unhappy with the podiatrist that she saw in [place] and the one she saw at . Did not feel like she got the adequate treatment she needed for her foot pain and for plantar wart. Would like another opinion. Did get orthotics and has been helping.    Review of Systems  General: no fever  Eyes: no blurry vision  ENT: no sore throat, no ear pain  Resp: no cough, sob or wheezing  Cardio: no chest pain, no palpitations  Abd: no nausea/vomiting  : no dysuria, no increased urinary frequency      /62   Pulse 64   Temp 36.4 °C (97.6 °F)   Ht 1.651 m (5' 5\")   Wt 66.8 kg (147 lb 3.2 oz)   SpO2 99%   BMI 24.50 kg/m²       Objective   Physical Exam  Gen: NAD, alert  Head: normocephalic/atraumatic  Eyes: conjunctivae normal  Ears: canals clear bilaterally, TM normal   Nose: external nose normal   Resp: Clear to auscultation  CVS: Regular rate and rhythm  Abdomen: soft, NT, ND  Ext: no edema, NT of lower extremities, plantar wart present  Gyn: whitish vaginal discharge, no cervical lesions, no motion tenderness present, bimanual exam normal    Assessment/Plan   Problem List Items Addressed This Visit    None  Visit Diagnoses         Pap smear for cervical cancer screening    -  Primary    Relevant Orders    THINPREP PAP TEST      Encounter for screening mammogram for malignant neoplasm of breast        Relevant Orders    BI mammo bilateral screening tomosynthesis      Routine health maintenance        Relevant Orders    CBC    Comprehensive Metabolic Panel               "

## 2025-06-04 ENCOUNTER — APPOINTMENT (OUTPATIENT)
Dept: DERMATOLOGY | Facility: CLINIC | Age: 55
End: 2025-06-04
Payer: COMMERCIAL

## 2025-06-04 DIAGNOSIS — B07.8 COMMON WART: Primary | ICD-10-CM

## 2025-06-04 DIAGNOSIS — L57.9 SKIN CHANGES DUE TO CHRONIC EXPOSURE TO NONIONIZING RADIATION: ICD-10-CM

## 2025-06-04 DIAGNOSIS — L40.9 PSORIASIS: ICD-10-CM

## 2025-06-04 DIAGNOSIS — L81.4 LENTIGO: ICD-10-CM

## 2025-06-04 DIAGNOSIS — L82.1 SEBORRHEIC KERATOSIS: ICD-10-CM

## 2025-06-04 DIAGNOSIS — D18.01 ANGIOMA OF SKIN: ICD-10-CM

## 2025-06-04 DIAGNOSIS — D22.9 BENIGN NEVUS: ICD-10-CM

## 2025-06-04 DIAGNOSIS — L21.9 SEBORRHEIC DERMATITIS: ICD-10-CM

## 2025-06-04 PROCEDURE — 99214 OFFICE O/P EST MOD 30 MIN: CPT | Performed by: NURSE PRACTITIONER

## 2025-06-04 PROCEDURE — 1036F TOBACCO NON-USER: CPT | Performed by: NURSE PRACTITIONER

## 2025-06-04 RX ORDER — HYDROCORTISONE 25 MG/G
CREAM TOPICAL
Qty: 30 G | Refills: 1 | Status: SHIPPED | OUTPATIENT
Start: 2025-06-04

## 2025-06-04 RX ORDER — KETOCONAZOLE 20 MG/G
CREAM TOPICAL
Qty: 60 G | Refills: 11 | Status: SHIPPED | OUTPATIENT
Start: 2025-06-04

## 2025-06-04 NOTE — PATIENT INSTRUCTIONS

## 2025-06-04 NOTE — PROGRESS NOTES
Subjective     Vonda Callahan is a 54 y.o. female who presents for the following: Skin Check.     Review of Systems:  No other skin or systemic complaints other than what is documented elsewhere in the note.    The following portions of the chart were reviewed this encounter and updated as appropriate:   Tobacco  Allergies  Meds  Problems  Med Hx  Surg Hx         Skin Cancer History  Biopsy Log Book  No skin cancers from Specimen Tracking.    Additional History      Specialty Problems          Dermatology Problems    Lichen sclerosus    Psoriasis        Objective   Well appearing patient in no apparent distress; mood and affect are within normal limits.    A full examination was performed including scalp, head, eyes, ears, nose, lips, neck, chest, axillae, abdomen, back, buttocks, bilateral upper extremities, bilateral lower extremities, hands, feet, fingers, toes, fingernails, and toenails. All findings within normal limits unless otherwise noted below.      Assessment/Plan   Skin Exam  1. COMMON WART  Left Plantar Surface of Heel  Verrucous plaque  -I reviewed the etiology of warts in detail with the patient. Discussed that this is a viral infection of the skin. Warts are difficult to eradicate as they occur in areas of relative immune incompetent skin. Treatments are aimed at creating local irritation to the skin, in order to activate the body's immune system to resolve the viral infection.   -Treatment options discussed with the family including topical therapies.  -Today elect to do the following:   -Start OTC salicylic acid 40% to involved areas once daily.  Instructions provided for use.   2. ANGIOMA OF SKIN  Generalized  Scattered cherry-red papule(s).  A cherry hemangioma is a small macule (small, flat, smooth area) or papule (small, solid bump) formed from an overgrowth of tiny blood vessels in the skin. Cherry hemangiomas are characteristically red or purplish in color. They often first appear in  middle adulthood and usually increase in number with age. Cherry hemangiomas are noncancerous (benign) and are common in adults.    The present appearance of the lesion is not worrisome but it should continue to be observed and testing/treatment may be warranted if change occurs.  Related Procedures  Follow Up In Dermatology - Established Patient  3. BENIGN NEVUS  Generalized  Scattered, uniform and benign-appearing, regular brown melanocytic papules and macules.    1. Left lower back has a slightly asymmetrical round 3 x 4.5 dark brown to tan brown maculewith central slight hypopigmented tan discoloration.  2. Left ankle has a 7.5 x 5 mm dark brown irregular shape macule.  3. The right lower abdomen has a 0.7 cm x 2cm dark brown tan patch with central light tan color   The present appearance of the lesions are not worrisome but it should continue to be observed and testing/treatment may be warranted if change occurs.    Lesions being monitored are stable. Compared to baseline photos, no new or changing lesions noted.   Related Procedures  Follow Up In Dermatology - Established Patient  4. SEBORRHEIC KERATOSIS  Generalized  Stuck on verrucous, tan-brown papules and plaques.    Seborrheic keratoses are common noncancerous (benign) growths of unknown cause seen in adults due to a thickening of an area of the top skin layer. Seborrheic keratoses may appear as if they are stuck on to the skin. They have distinct borders, and they may appear as papules (small, solid bumps) or plaques (solid, raised patches that are bigger than a thumbnail). They may be the same color as your skin, or they may be pink, light brown, darker brown, or very dark brown, or sometimes may appear black.    There is no way to prevent new seborrheic keratoses from forming. Seborrheic keratoses can be removed, but removal is considered a cosmetic issue and is usually not covered by insurance.    PLAN  No treatment is needed unless there is  irritation from clothing, such as itching or bleeding.  2.   Some lotions containing alpha hydroxy acids, salicylic acid, or urea may make the areas feel smoother with regular use but will not eliminate them.  Related Procedures  Follow Up In Dermatology - Established Patient  5. LENTIGO  Generalized  Scattered tan macules in sun-exposed areas.  A solar lentigo (plural, solar lentigines), also known as a sun-induced freckle or senile lentigo, is a dark (hyperpigmented) lesion caused by natural or artificial ultraviolet (UV) light. Solar lentigines may be single or multiple. This type of lentigo is different from a simple lentigo (lentigo simplex) because it is caused by exposure to UV light. Solar lentigines are benign, but they do indicate excessive sun exposure, a risk factor for the development of skin cancer.    To prevent solar lentigines, avoid exposure to sunlight in midday (10 AM to 3 PM), wear sun-protective clothing (tightly woven clothes and hats), and apply sunscreen (SPF 30 UVA and UVB block).    The present appearance of the lesion is not worrisome but it should continue to be observed and testing/treatment may be warranted if change occurs.  Related Procedures  Follow Up In Dermatology - Established Patient  6. SKIN CHANGES DUE TO CHRONIC EXPOSURE TO NONIONIZING RADIATION  Generalized  Actinic changes in the form of freckles, lentigines and hyper/hypopigmentation   ABCDEs of melanoma and atypical moles were discussed with the patient.    Patient was instructed to perform monthly self skin examination.  We recommended that the patient have regular full skin exams given an increased risk of subsequent skin cancers.    The patient was instructed to use sun protective behaviors including use of broad spectrum sunscreens and sun protective clothing to reduce risk of skin cancers.    Warning signs of non-melanoma skin cancer discussed.  Related Procedures  Follow Up In Dermatology - Established Patient  7.  SEBORRHEIC DERMATITIS  Head - Anterior (Face)  No erythematous papules or plaques with greasy scale to affected areas  Scalp, T zone and post auricular area is clear. Uses ketoconazole cream twice daily as needed.   Related Procedures  Follow Up In Dermatology - Established Patient  Related Medications  ketoconazole (NIZOral) 2 % cream  To affected areas twice daily as needed  8. PSORIASIS  Generalized  A few pink scaly papules on arms <1%BSA  Rarely using HTC cream. Continue with using to affected areas twice daily as needed. No RF needed.   Related Procedures  Follow Up In Dermatology - Established Patient  Related Medications  hydrocortisone 2.5 % cream  Apply 1/4 gram to affected areas twice daily as needed for rash. Avoid using more than 14 days per month.    Return to clinic in 1 year for skin check/follow up or sooner if needed

## 2025-06-04 NOTE — Clinical Note
-I reviewed the etiology of warts in detail with the patient. Discussed that this is a viral infection of the skin. Warts are difficult to eradicate as they occur in areas of relative immune incompetent skin. Treatments are aimed at creating local irritation to the skin, in order to activate the body's immune system to resolve the viral infection.   -Treatment options discussed with the family including topical therapies.  -Today elect to do the following:   -Start OTC salicylic acid 40% to involved areas once daily.  Instructions provided for use.

## 2025-06-04 NOTE — Clinical Note
Rarely using HTC cream. Continue with using to affected areas twice daily as needed. No RF needed.

## 2025-06-04 NOTE — Clinical Note
The present appearance of the lesions are not worrisome but it should continue to be observed and testing/treatment may be warranted if change occurs.    Lesions being monitored are stable. Compared to baseline photos, no new or changing lesions noted.

## 2025-06-05 ENCOUNTER — HOSPITAL ENCOUNTER (OUTPATIENT)
Dept: RADIOLOGY | Facility: HOSPITAL | Age: 55
Discharge: HOME | End: 2025-06-05
Payer: COMMERCIAL

## 2025-06-05 VITALS — BODY MASS INDEX: 24.49 KG/M2 | HEIGHT: 65 IN | WEIGHT: 147 LBS

## 2025-06-05 DIAGNOSIS — Z12.31 ENCOUNTER FOR SCREENING MAMMOGRAM FOR MALIGNANT NEOPLASM OF BREAST: ICD-10-CM

## 2025-06-05 PROCEDURE — 77067 SCR MAMMO BI INCL CAD: CPT

## 2025-06-05 PROCEDURE — 77063 BREAST TOMOSYNTHESIS BI: CPT | Performed by: RADIOLOGY

## 2025-06-05 PROCEDURE — 77067 SCR MAMMO BI INCL CAD: CPT | Performed by: RADIOLOGY

## 2025-06-06 LAB
ALBUMIN SERPL-MCNC: 5.1 G/DL (ref 3.6–5.1)
ALP SERPL-CCNC: 86 U/L (ref 37–153)
ALT SERPL-CCNC: 48 U/L (ref 6–29)
ANION GAP SERPL CALCULATED.4IONS-SCNC: 12 MMOL/L (CALC) (ref 7–17)
AST SERPL-CCNC: 37 U/L (ref 10–35)
BILIRUB SERPL-MCNC: 0.7 MG/DL (ref 0.2–1.2)
BUN SERPL-MCNC: 11 MG/DL (ref 7–25)
CALCIUM SERPL-MCNC: 9.8 MG/DL (ref 8.6–10.4)
CHLORIDE SERPL-SCNC: 102 MMOL/L (ref 98–110)
CO2 SERPL-SCNC: 25 MMOL/L (ref 20–32)
CREAT SERPL-MCNC: 0.7 MG/DL (ref 0.5–1.03)
EGFRCR SERPLBLD CKD-EPI 2021: 101 ML/MIN/1.73M2
ERYTHROCYTE [DISTWIDTH] IN BLOOD BY AUTOMATED COUNT: 12.9 % (ref 11–15)
GLUCOSE SERPL-MCNC: 122 MG/DL (ref 65–99)
HCT VFR BLD AUTO: 43.1 % (ref 35–45)
HGB BLD-MCNC: 14.1 G/DL (ref 11.7–15.5)
MCH RBC QN AUTO: 30.7 PG (ref 27–33)
MCHC RBC AUTO-ENTMCNC: 32.7 G/DL (ref 32–36)
MCV RBC AUTO: 93.9 FL (ref 80–100)
PLATELET # BLD AUTO: 250 THOUSAND/UL (ref 140–400)
PMV BLD REES-ECKER: 10.3 FL (ref 7.5–12.5)
POTASSIUM SERPL-SCNC: 4.1 MMOL/L (ref 3.5–5.3)
PROT SERPL-MCNC: 7.6 G/DL (ref 6.1–8.1)
RBC # BLD AUTO: 4.59 MILLION/UL (ref 3.8–5.1)
SODIUM SERPL-SCNC: 139 MMOL/L (ref 135–146)
WBC # BLD AUTO: 10.4 THOUSAND/UL (ref 3.8–10.8)

## 2025-06-15 LAB
CYTOLOGY CMNT CVX/VAG CYTO-IMP: NORMAL
LAB AP HPV GENOTYPE QUESTION: YES
LAB AP HPV HR: NORMAL
LABORATORY COMMENT REPORT: NORMAL
PATH REPORT.TOTAL CANCER: NORMAL

## 2025-07-25 ENCOUNTER — RESULTS FOLLOW-UP (OUTPATIENT)
Dept: PRIMARY CARE | Facility: CLINIC | Age: 55
End: 2025-07-25
Payer: COMMERCIAL

## 2025-07-31 DIAGNOSIS — R74.8 ELEVATED LIVER ENZYMES: Primary | ICD-10-CM

## 2026-04-30 ENCOUNTER — APPOINTMENT (OUTPATIENT)
Dept: PRIMARY CARE | Facility: CLINIC | Age: 56
End: 2026-04-30
Payer: COMMERCIAL

## 2026-06-10 ENCOUNTER — APPOINTMENT (OUTPATIENT)
Dept: DERMATOLOGY | Facility: CLINIC | Age: 56
End: 2026-06-10
Payer: COMMERCIAL